# Patient Record
Sex: FEMALE | Race: BLACK OR AFRICAN AMERICAN | NOT HISPANIC OR LATINO
[De-identification: names, ages, dates, MRNs, and addresses within clinical notes are randomized per-mention and may not be internally consistent; named-entity substitution may affect disease eponyms.]

---

## 2021-01-01 ENCOUNTER — APPOINTMENT (OUTPATIENT)
Dept: PHYSICAL MEDICINE AND REHAB | Facility: CLINIC | Age: 46
End: 2021-01-01
Payer: COMMERCIAL

## 2021-01-01 ENCOUNTER — RESULT REVIEW (OUTPATIENT)
Age: 46
End: 2021-01-01

## 2021-01-01 VITALS — HEIGHT: 61 IN | BODY MASS INDEX: 22.66 KG/M2 | WEIGHT: 120 LBS

## 2021-01-01 DIAGNOSIS — Z85.3 PERSONAL HISTORY OF MALIGNANT NEOPLASM OF BREAST: ICD-10-CM

## 2021-01-01 DIAGNOSIS — Z78.9 OTHER SPECIFIED HEALTH STATUS: ICD-10-CM

## 2021-01-01 DIAGNOSIS — R06.00 DYSPNEA, UNSPECIFIED: ICD-10-CM

## 2021-01-01 DIAGNOSIS — J90 PLEURAL EFFUSION, NOT ELSEWHERE CLASSIFIED: ICD-10-CM

## 2021-01-01 PROCEDURE — 99203 OFFICE O/P NEW LOW 30 MIN: CPT | Mod: GC

## 2021-01-01 RX ORDER — MV-MIN/FOLIC/VIT K/LYCOP/COQ10 200-100MCG
CAPSULE ORAL
Refills: 0 | Status: ACTIVE | COMMUNITY

## 2021-05-13 PROBLEM — Z00.00 ENCOUNTER FOR PREVENTIVE HEALTH EXAMINATION: Status: ACTIVE | Noted: 2021-01-01

## 2021-12-16 PROBLEM — Z85.3 HISTORY OF MALIGNANT NEOPLASM OF BREAST: Status: RESOLVED | Noted: 2021-01-01 | Resolved: 2021-01-01

## 2021-12-16 PROBLEM — Z78.9 MODERATE EXERCISE: Status: ACTIVE | Noted: 2021-01-01

## 2021-12-16 PROBLEM — R06.00 DYSPNEA: Status: ACTIVE | Noted: 2021-01-01

## 2021-12-16 PROBLEM — J90 PLEURAL EFFUSION: Status: ACTIVE | Noted: 2021-01-01

## 2021-12-16 PROBLEM — Z78.9 SOCIAL ALCOHOL USE: Status: ACTIVE | Noted: 2021-01-01

## 2021-12-16 NOTE — ASSESSMENT
[FreeTextEntry1] : Ms. ILIANA PALOMO is a 46 year old female hx of breast cancer, who presents with R mid/upper back pain x 1 month, relieved after thoracentesis and recurring pain. She notes worsening pain and dyspnea with lying down. History and exam findings consistent with likely recurrent pleural effusion, unlikely musculoskeletal in origin. Recommend following:\par \par -Advised patient to contact pulmonologist to be seen today. If unable to schedule appointment with pulmonologist today, patient advised to go to the ER for further evaluation and possible thoracentesis for which she agreed. \par -Patient was supposed to be scheduled with cardiothoracic surgeon per her pulmonologist but was called to this office. Patient was told that she should contact the pulmonologist about setting her up with a cardiothoracic surgeon for further evaluation. \par \par Patient in agreement with plan. All questions answered.

## 2021-12-16 NOTE — PHYSICAL EXAM
[FreeTextEntry1] : PE:\par Constitutional: In NAD, calm and cooperative\par Cardio: normal s1, s2 no murmurs auscultated\par Pulm: Decreased breath sounds on the right at mid and base. No wheezes or crackles. Coarse lung sounds R apical region. \par MSK (upper back)\par 	Inspection: no gross swelling identified\par 	Palpation: no tenderness to palpation at ribs, periscapular region, midline cervical or thoracic paraspinals. \par 	ROM: Full\par 	Strength: 5/5 strength in bilateral lower extremities\par 	Reflexes: 2+ Patella reflex bilaterally, 2+ Achilles reflex bilaterally, negative clonus bilaterally\par 	Sensation: Intact to light touch in bilateral lower extremities\par Able to heel and toe-walk

## 2021-12-16 NOTE — HISTORY OF PRESENT ILLNESS
[FreeTextEntry1] : Ms. ILIANA PALOMO is a 46 year old female hx breast cancer 2016 treated with chemo, radiation, and mastectomy who presents upper and mid back \par \par Location: upper and mid R back , R ribs\par Onset: 1 month ago - same type of pain relieved w/ thoracentesis initially but has returned 1 week after. s/p R sided thoracentesis 2 weeks ago. \par Provocation/Palliative: lying down makes pain worse, has trouble breathing laying flat\par Quality: dull/achy \par Radiation: localized\par Severity:6/10 now, was  8/10 \par Timing: worst at night with lying down \par \par Denies any associated numbness. Denies any associated leg weakness. Denies any loss of bowel/bladder control or any groin numbness.\par Previous medications trialed: prednisone - did not help.  ibuprofen 400mg every 2 hours, not helping. robaxin (post thoracentesis) does not help. \par Previous procedures relevant to complaint: R sided thoracentesis 2 weeks ago\par Conservative therapy tried?: none\par  
rolling walker (5 inch wheels)

## 2022-01-01 ENCOUNTER — RESULT REVIEW (OUTPATIENT)
Age: 47
End: 2022-01-01

## 2022-04-30 PROBLEM — Z00.00 ENCOUNTER FOR PREVENTIVE HEALTH EXAMINATION: Status: ACTIVE | Noted: 2022-04-30
